# Patient Record
Sex: FEMALE | Race: WHITE | RURAL
[De-identification: names, ages, dates, MRNs, and addresses within clinical notes are randomized per-mention and may not be internally consistent; named-entity substitution may affect disease eponyms.]

---

## 2020-06-11 ENCOUNTER — OFFICE VISIT (OUTPATIENT)
Dept: FAMILY MEDICINE CLINIC | Age: 40
End: 2020-06-11

## 2020-06-11 ENCOUNTER — HOSPITAL ENCOUNTER (OUTPATIENT)
Dept: LAB | Age: 40
Discharge: HOME | End: 2020-06-11

## 2020-06-11 VITALS
DIASTOLIC BLOOD PRESSURE: 77 MMHG | SYSTOLIC BLOOD PRESSURE: 120 MMHG | RESPIRATION RATE: 20 BRPM | BODY MASS INDEX: 19.66 KG/M2 | WEIGHT: 118 LBS | HEART RATE: 80 BPM | HEIGHT: 65 IN | TEMPERATURE: 97.7 F | OXYGEN SATURATION: 100 %

## 2020-06-11 DIAGNOSIS — F90.2 ATTENTION DEFICIT HYPERACTIVITY DISORDER (ADHD), COMBINED TYPE: ICD-10-CM

## 2020-06-11 DIAGNOSIS — F90.2 ATTENTION DEFICIT HYPERACTIVITY DISORDER (ADHD), COMBINED TYPE: Primary | ICD-10-CM

## 2020-06-11 RX ORDER — MEDROXYPROGESTERONE ACETATE 150 MG/ML
150 INJECTION, SUSPENSION INTRAMUSCULAR ONCE
COMMUNITY
End: 2021-07-13

## 2020-06-11 RX ORDER — DEXTROAMPHETAMINE SACCHARATE, AMPHETAMINE ASPARTATE, DEXTROAMPHETAMINE SULFATE AND AMPHETAMINE SULFATE 5; 5; 5; 5 MG/1; MG/1; MG/1; MG/1
20 TABLET ORAL 2 TIMES DAILY
Qty: 60 TAB | Refills: 0 | Status: SHIPPED | OUTPATIENT
Start: 2020-06-11 | End: 2020-07-10 | Stop reason: SDUPTHER

## 2020-06-11 NOTE — LETTER
Name:Murali Lobo Payment KVV:5/08/4685 MR #:<[id number]> 2107 [facility] Page 1 of 5 CONTROLLED SUBSTANCE AGREEMENT I may be prescribed medications that are controlled substances as part  of my treatment plan for management of my medical condition(s). The goal of my treatment plan is to maintain and/or improve my health and wellbeing. Because controlled substances have an increased risk of abuse or harm, continual re-evaluation is needed determine if the goals of my treatment plan are being met for my safety and the safety of others. Espinoza Roque  am entering into this Controlled Substance Agreement with my provider, __________________________________ at [facility] . I understand that successful treatment requires mutual trust and honesty between me and my provider. I understand that there are state and federal laws and regulations which apply to the medications that my provider may prescribe that must be followed. I understand there are risks and benefits ts of taking the medicines that my provider may prescribe. I understand and agree that following this Agreement is necessary in continuing my provider-patient relationship and success of my treatment plan. As a part of my treatment plan, I agree to the following: COMMUNICATION: 
 
1. I will communicate fully with my provider about my medical condition(s), including the effect on my daily life and how well my medications are helping. I will tell my provider all of the medications that I take for any reason, including medications I receive from another health care provider, and will notify my provider about all issues, problems or concerns, including any side effects, which may be related to my medications. I understand that this information allows my provider to adjust my treatment plan to help manage my medical condition.  I understand that this information will become part of my permanent medical record. 2. I will notify my provider if I have a history of alcohol/drug misuse/addiction or if I have had treatment for alcohol/drug addiction in the past, or if I have a new problem with or concern about alcohol/drug use/addiction, because this increases the likelihood of high risk behaviors and may lead to serious medical conditions. 3. Females Only: I will notify my provider if I am or become pregnant, or if I intend to become pregnant, or if I intend to breastfeed. I understand that communication of these issues with my provider is important, due to possible effects my medication could have on an unborn fetus or breastfeeding child. Initials_____ Name:.Eli Subramanian AGT:1/59/5105 MR #:<[id number]> 2102 [facility] Page 2 of 5 MISUSE OF MEDICATIONS / DRUGS: 
 
1. I agree to take all controlled substances as prescribed, and will not misuse or abuse any controlled substances prescribed by my provider. For my safety, I will not increase the amount of medicine I take without first talking with and getting permission from my provider. 2. If I have a medical emergency, another health care provider may prescribe me medication. If I seek emergency treatment, I will notify my provider within seventy-two (72) hours. 3. I understand that my provider may discuss my use and/or possible misuse/abuse of controlled substances and alcohol, as appropriate, with any health care provider involved in my care, pharmacist or legal authority. ILLEGAL DRUGS: 
 
1. I will not use illegal drugs of any kind, including but not limited to marijuana, heroin, cocaine, or any prescription drug which is not prescribed to me. DRUG DIVERSION / PRESCRIPTION FRAUD: 
 
1. I will not share, sell, trade, give away, or otherwise misuse my prescriptions or medications. 2. I will not alter any prescriptions provided to me by my provider. SINGLE PROVIDER: 
 
1. I agree that all controlled substances that I take will be prescribed only by my provider (or his/her covering provider) under this Agreement. This agreement does not prevent me from seeking emergency medical treatment or receiving pain management related to a surgery. PROTECTING MEDICATIONS: 
 
1. I am responsible for keeping my prescriptions and medications in a safe and secure place including safeguarding them from loss or theft. I understand that lost, stolen or damaged/destroyed prescriptions or medications will not be replaced. Initials____ Name:Murali Fuentes File LXN:9/54/9799 MR #:<[id number]> 2102 [facility] Page 3 of 5 PRESCRIPTION RENEWALS/REFILLS: 
 
1. I will follow my controlled substance medication schedule as prescribed by my provider. 2. I understand and agree that I will make any requests for renewals or refills of my prescriptions only at the time of an office visit or during my providers regular office hours subject to the prescription refill requirements of the individual practice. 3. I understand that my provider may not call in prescriptions for controlled substances to my pharmacy. 4. I understand that my provider may adjust or discontinue these medications as deemed appropriate for my medical treatment plan. This Agreement does not guarantee the prescription of controlled medications. 5. I agree that if my medications are adjusted or discontinued, I will properly dispose of any remaining medications. I understand that I will be required to dispose of any remaining controlled medications prior to being provided with any prescriptions for other controlled medications.  
 
6. I understand that the renewal of my prescription depends on my medical condition, my consistent participation, and my adherence with my treatment plan and this Agreement. 7. I understand that if I do not keep an appointment with my provider, I may not receive a renewal or refill for my controlled substance medication. PRESCRIPTION MONITORING / DRUG TESTIN. I understand that my provider may require me to provide urine, saliva or blood for testing at any time. I understand that this testing will be used to monitor for safety and adherence with my treatment plan and this Agreement. 2. I understand that my provider may ask me to provide an observed urine specimen, which means that a nurse or other health care provider may watch me provide urine, and I agree to cooperate if I am asked to provide an observed specimen. 3. I understand that if I do not provide urine, saliva or blood samples within two (2) hours of my providers request, or other timeframe decided by my provider, my treatment plan could be changed, or my prescriptions and medications may be changed or ended. 4. I understand that urine, saliva and blood test results will be a part of my permanent medical record. Initials_____ Name:Murali Fuentes File ETQ:3/95/7863 MR #:<[id number]> 2102 [facility] Page 4 of 5 
 
 
1. I authorize my provider and my pharmacy to cooperate fully with any local, state, or federal law enforcement agency in the investigation of any possible misuse, sale, or other diversion of my controlled substance prescriptions or medications. RISKS: 
 
1. I understand that my level of consciousness may be affected from the use of controlled substances, and I understand that there are risks, benefits, effects and potential alternatives (including no treatment) to the medications that my provider has prescribed. 2. I understand that I may become drowsy, tired, dizzy, constipated, and sick to my stomach, or have changes in my mood or in my sleep while taking my medications. I have talked with my provider about these possible side effects, risks, benefits, and alternative treatments, and my provider has answered all of my questions. 3. I understand that I should not suddenly stop taking my medications without first speaking with my provider. I understand that if I suddenly stop taking my medications, I may experience nausea, vomiting, sweating,anxiety, sleeplessness, itching or other uncomfortable feelings. 4. I will not take my medications with alcohol of any kind, including beer, wine or liquor. I understand that drinking alcohol with my medications increases the chances of side effects, including breathing problems or even death. 5. I understand that if I have a history of alcoholism or other drug addiction I may be at increased risk of addiction to my medications. Signs of addiction might include craving, compulsive use, and continued use despite harm. Since addiction is a disease, I understand my provider may decide to change my medications and refer me to appropriate treatment services. I understand that this information would become part of my permanent medical record. Initials_____ Name:.Eli Jackson CVL:7/15/0858 MR #:<[id number]>  [facility] Page 5 of 5  
 
 
6. Females only: Children born to women who regularly take controlled substances are likely to have physical problems and suffer withdrawal symptoms at birth. If I am of child-bearing age, I understand that I should use safe and effective birth control while taking any controlled substances to avoid the impact of medications on an unborn fetus or  child. I agree to notify my provider immediately if I should become pregnant so that my treatment plan can be adjusted. 7. Males only: I understand that chronic use of controlled substances has been associated with low testosterone levels in males which may affect my mood, stamina, sexual desire, and general health. I understand that my provider may order the appropriate laboratory test to determine my testosterone level,and I agree to this testing. ADHERENCE: 
 
1. I understand that if I do not adhere to this Agreement in any way, my provider may change my prescriptions, stop prescribing controlled substances or end our provider-patient relationship. 2. If my provider decides to stop prescribing medication, or decides to end our provider-patient relationship,my provider may require that I taper my medications slowly. If necessary, my provider may also provide a prescription for other medications to treat my withdrawal symptoms. UNDERSTANDING THIS AGREEMENT: 
 
I understand that my provider may adjust or stop my prescriptions for controlled substances based on my medical condition and my treatment plan. I understand that this Agreement does not guarantee that I will be prescribed medications or controlled substances. I understand that controlled substances may be just one part 
of my treatment plan.  
 
My initial on each page and my signature below shows that I have read each page of this Agreement, I have had an opportunity to ask questions, and all of my questions have been answered to my satisfaction by my provider. By signing below, I agree to comply with this Agreement, and I understand that if I do not follow the Agreements listed above, my provider may stop 
 
_________________________________________  Date/Time 6/11/2020 10:45 AM   
             (Patient Signature) 
 
________________________________________    Date/Time 6/11/2020 10:45 AM 
 (Parent or Guardian Signature if <18 yrs) 
 
_________________________________________ Date/Time 6/11/2020 10:45 AM 
 (Provider Signature)

## 2020-06-11 NOTE — PROGRESS NOTES
1. Have you been to the ER, urgent care clinic since your last visit? Hospitalized since your last visit? No    2. Have you seen or consulted any other health care providers outside of the [address] [place] [address] since your last visit? Include any pap smears or colon screening. No    Reviewed record in preparation for visit and have necessary documentation  Goals that were addressed and/or need to be completed during or after this appointment include     Health Maintenance Due   Topic Date Due    DTaP/Tdap/Td series (1 - Tdap) 01/14/2002    PAP AKA CERVICAL CYTOLOGY  01/14/2002       Patient is accompanied by self I have received verbal consent from Tobin Mirza to discuss any/all medical information while they are present in the room.

## 2020-06-17 LAB
AMPHETAMINES UR QL SCN: NEGATIVE NG/ML
BARBITURATES UR QL SCN: NEGATIVE NG/ML
BENZODIAZ UR QL SCN: NEGATIVE NG/ML
BZE UR QL SCN: NEGATIVE NG/ML
CANNABINOID, 737736: POSITIVE
CANNABINOIDS UR QL SCN: NORMAL NG/ML
CARBOXY THC (GC/MS), 737737: 16 NG/ML
CREAT UR-MCNC: 22.8 MG/DL (ref 20–300)
METHADONE UR QL SCN: NEGATIVE NG/ML
OPIATES UR QL SCN: NEGATIVE NG/ML
OXYCODONE+OXYMORPHONE UR QL SCN: NEGATIVE NG/ML
PCP UR QL: NEGATIVE NG/ML
PH UR: 6.5 [PH] (ref 4.5–8.9)
PLEASE NOTE:, 733157: NORMAL
PROPOXYPH UR QL SCN: NEGATIVE NG/ML

## 2020-06-17 NOTE — PROGRESS NOTES
Progress Note    Patient: Yamil Mulligan MRN: [id number]  SSN: [id number]    YOB: 1980  Age: 45 y.o. Sex: female        Chief Complaint   Patient presents with   [address]     she is a 45y.o. year old female who presents to [facility] care. Patient seen at another practice. She says she was positive for cannaboids on UDS and was discharged. She says she had been using CBD oil. Denies use of marijuana or illicit drugs.  reviewed. Advised patient that if drug screen returns positive for [facility] she will need to choose between CBD or medication. Patient expressed understanding. [facility] signed. Addendum: UDS results from 2020. Component  Value  Flag  Ref Range  Units  Status    Cannabinoid  Positive  Abnormal    Cutoff=20     Final    Cannabinoid confirm  16   Cutoff=10  ng/mL  Final          Encounter Diagnoses   Name Primary?     Attention deficit hyperactivity disorder (ADHD), combined type Yes       Patient Active Problem List   Diagnosis Code    HX: contraception Z92.0    Cough R05    Pneumonia J18.9     Past Surgical History:   Procedure Laterality Date    DELIVERY       HX TONSILLECTOMY  2000    MD UNS ORAL SURG [address] BY REPORT  2009    wisdom teeth x4     Social History     Socioeconomic History    Marital status:      Spouse name: Not on file    Number of children: Not on file    Years of education: Not on file    Highest education level: Not on file   Occupational History    Not on file   Social Needs    Financial resource strain: Not on file    Food insecurity     Worry: Not on file     Inability: Not on file    Transportation needs     Medical: Not on file     Non-medical: Not on file   Tobacco Use    Smoking status: Never Smoker   Substance and Sexual Activity    Alcohol use: Yes     Comment: wine occasionally    Drug use: No    Sexual activity: Yes     Partners: Male     Birth control/protection: Pill   Lifestyle    Physical activity Days per week: Not on file     Minutes per session: Not on file    Stress: Not on file   Relationships    Social connections     Talks on phone: Not on file     Gets together: Not on file     Attends Zoroastrian service: Not on file     Active member of club or organization: Not on file     Attends meetings of clubs or organizations: Not on file     Relationship status: Not on file    Intimate partner violence     Fear of current or ex partner: Not on file     Emotionally abused: Not on file     Physically abused: Not on file     Forced sexual activity: Not on file   Other Topics Concern    Not on file   Social History Narrative    Not on file     Family History   Problem Relation Age of Onset    Hypertension Father     Diabetes Father     Asthma Brother     Cancer Paternal Grandfather     Alcohol abuse Neg Hx     Arthritis-rheumatoid Neg Hx     Bleeding Prob Neg Hx     Elevated Lipids Neg Hx     Headache Neg Hx     Heart Disease Neg Hx     Lung Disease Neg Hx     Migraines Neg Hx     Psychiatric Disorder Neg Hx     Stroke Neg Hx     Mental Retardation Neg Hx      Current Outpatient Medications   Medication Sig    multivitamin with iron (DAILY WILL WITH IRON PO) Take  by mouth.  medroxyPROGESTERone (Depo-Provera) 150 mg/mL syrg 150 mg by IntraMUSCular route once.  dextroamphetamine-amphetamine (ADDERALL) 20 mg tablet Take 1 Tab by mouth two (2) times a day. Max Daily Amount: 40 mg. No current facility-administered medications for this visit.       No Known Allergies    Review of Systems:  Constitutional: Negative for fatigue, malaise  Resp: Negative for cough, wheezing or SOB  CV: Negative for chest pain, dizziness or palpitations  GI: Negative for nausea or abdominal pain  MS: Negative for acute myalgias or arthralgias   Neuro: Negative for HA, weakness or paresthesia  Psych: see HPI    Vitals:    06/11/20 0957   BP: 120/77   Pulse: 80   Resp: 20   Temp: 97.7 °F (36.5 °C)   TempSrc: Oral SpO2: 100%   Weight: 118 lb (53.5 kg)   Height: 5' 5\" (1.651 m)       Physical Examination:  General: Well developed, well nourished, in no acute distress  Head: Normocephalic, atraumatic  Eyes: Sclera clear, EOMI  Neck: Normal range of motion  Respiratory: symmetrical, unlabored effort  Cardiovascular: Regular rate and rhythm  Extremities: Full range of motion, normal gait  Neurologic: No focal deficits  Psych: Active, alert and oriented. Affect appropriate       ICD-10-CM ICD-9-CM    1. Attention deficit hyperactivity disorder (ADHD), combined type F90.2 314.01 10-DRUG SCREEN W/CONF      dextroamphetamine-amphetamine (ADDERALL) 20 mg tablet     Plan of care:  Diagnoses were discussed in detail with patient. Medication risks/benefits/side effects discussed with patient. All of the patient's questions were addressed and answered to apparent satisfaction. The patient understands and agrees with our plan of care. The patient knows to call back if they have questions about the plan of care or if symptoms change. The patient received an After-Visit Summary which contains VS, diagnoses, orders, allergy and medication lists. No future appointments. Follow-up and Dispositions    · Return in about 3 months (around 9/11/2020).

## 2020-07-10 DIAGNOSIS — F90.2 ATTENTION DEFICIT HYPERACTIVITY DISORDER (ADHD), COMBINED TYPE: ICD-10-CM

## 2020-07-10 RX ORDER — DEXTROAMPHETAMINE SACCHARATE, AMPHETAMINE ASPARTATE, DEXTROAMPHETAMINE SULFATE AND AMPHETAMINE SULFATE 5; 5; 5; 5 MG/1; MG/1; MG/1; MG/1
20 TABLET ORAL 2 TIMES DAILY
Qty: 60 TAB | Refills: 0 | Status: SHIPPED | OUTPATIENT
Start: 2020-07-10 | End: 2020-08-24 | Stop reason: SDUPTHER

## 2020-07-10 RX ORDER — DEXTROAMPHETAMINE SACCHARATE, AMPHETAMINE ASPARTATE, DEXTROAMPHETAMINE SULFATE AND AMPHETAMINE SULFATE 5; 5; 5; 5 MG/1; MG/1; MG/1; MG/1
20 TABLET ORAL 2 TIMES DAILY
Qty: 60 TAB | Refills: 0 | OUTPATIENT
Start: 2020-07-10

## 2020-07-10 NOTE — TELEPHONE ENCOUNTER
Will refill. However, if she does not get the UDS Monday, I will terminate the controlled medication contract.

## 2020-07-10 NOTE — TELEPHONE ENCOUNTER
Patient came by office to complete lab work since her refill is due tomorrow, and she would like to have it before the weekend. Lab closed at 3:30 without any warning. Please advise.

## 2020-08-13 DIAGNOSIS — F90.2 ATTENTION DEFICIT HYPERACTIVITY DISORDER (ADHD), COMBINED TYPE: ICD-10-CM

## 2020-08-13 RX ORDER — DEXTROAMPHETAMINE SACCHARATE, AMPHETAMINE ASPARTATE, DEXTROAMPHETAMINE SULFATE AND AMPHETAMINE SULFATE 5; 5; 5; 5 MG/1; MG/1; MG/1; MG/1
20 TABLET ORAL 2 TIMES DAILY
Qty: 60 TAB | Refills: 0 | OUTPATIENT
Start: 2020-08-13

## 2020-08-14 ENCOUNTER — HOSPITAL ENCOUNTER (OUTPATIENT)
Dept: LAB | Age: 40
Discharge: HOME | End: 2020-08-14

## 2020-08-14 DIAGNOSIS — F90.2 ATTENTION DEFICIT HYPERACTIVITY DISORDER (ADHD), COMBINED TYPE: ICD-10-CM

## 2020-08-14 NOTE — TELEPHONE ENCOUNTER
Patient did not get drug screen ordered on 7/10/20. She was advised this was required for further refills of controlled medication. Request denied and will no longer prescribe controlled medication for this patient.

## 2020-08-14 NOTE — TELEPHONE ENCOUNTER
Pt states that she got off work early in July and came to do the UDS but the lab closed around 2:00 that day. She states that she can come do the UDS today. She is requesting a call back today.

## 2020-08-17 ENCOUNTER — TELEPHONE (OUTPATIENT)
Dept: FAMILY MEDICINE CLINIC | Age: 40
End: 2020-08-17

## 2020-08-17 NOTE — TELEPHONE ENCOUNTER
Called patient and advise her there was a problem with her urine which could not be tested for drug screen and she would need to come in to submit another urine sample. Advised her that the urine spilled and unable to test. Patient states she will go to the doctor somewhere and did not want to deal with our office.

## 2020-08-20 NOTE — TELEPHONE ENCOUNTER
Patient failed to return for ordered UDS. Patient has expressed that she wants to go elsewhere for her ADHD.

## 2020-08-21 ENCOUNTER — TELEPHONE (OUTPATIENT)
Dept: FAMILY MEDICINE CLINIC | Age: 40
End: 2020-08-21

## 2020-08-21 DIAGNOSIS — F90.2 ATTENTION DEFICIT HYPERACTIVITY DISORDER (ADHD), COMBINED TYPE: Primary | ICD-10-CM

## 2020-08-24 RX ORDER — DEXTROAMPHETAMINE SACCHARATE, AMPHETAMINE ASPARTATE, DEXTROAMPHETAMINE SULFATE AND AMPHETAMINE SULFATE 5; 5; 5; 5 MG/1; MG/1; MG/1; MG/1
20 TABLET ORAL 2 TIMES DAILY
Qty: 60 TAB | Refills: 0 | Status: SHIPPED | OUTPATIENT
Start: 2020-08-24 | End: 2020-09-21 | Stop reason: SDUPTHER

## 2020-08-24 NOTE — TELEPHONE ENCOUNTER
Pt states tat she came in for her drug test so she can get her medication refilled. Just wanting an update to what is going on. Please call Pt back.

## 2020-08-27 ENCOUNTER — VIRTUAL VISIT (OUTPATIENT)
Dept: FAMILY MEDICINE CLINIC | Age: 40
End: 2020-08-27
Payer: COMMERCIAL

## 2020-08-27 DIAGNOSIS — L25.5 DERMATITIS DUE TO PLANTS, INCLUDING POISON IVY, SUMAC, AND OAK: Primary | ICD-10-CM

## 2020-08-27 PROCEDURE — 96372 THER/PROPH/DIAG INJ SC/IM: CPT | Performed by: FAMILY MEDICINE

## 2020-08-27 PROCEDURE — 99213 OFFICE O/P EST LOW 20 MIN: CPT | Performed by: FAMILY MEDICINE

## 2020-08-27 RX ORDER — PREDNISONE 20 MG/1
20 TABLET ORAL 2 TIMES DAILY
Qty: 10 TAB | Refills: 0 | Status: SHIPPED | OUTPATIENT
Start: 2020-08-27 | End: 2020-09-01

## 2020-08-27 RX ORDER — TRIAMCINOLONE ACETONIDE 40 MG/ML
40 INJECTION, SUSPENSION INTRA-ARTICULAR; INTRAMUSCULAR ONCE
Qty: 1 ML | Refills: 0
Start: 2020-08-27 | End: 2020-08-27

## 2020-08-27 NOTE — PROGRESS NOTES
Sergei Baptiste is a 45 y.o. female evaluated via video on 20. Patient Identity confirmed by . Telephone encounter done in lieu of office visit due to extraordinary circumstances. A state of national and state emergency has been declared by the President and the [place] due to the [employer] pandemic. Pursuant to the emergency declaration under the [facility] [address] authority and the [employer] and [employer] Act, this Virtual  Visit was conducted, with patient's consent, to reduce the patient's risk of exposure to COVID-19 and provide continuity of care for an established patient. Herbie Lomeli LPN coordinated virtual visit    Consent:  Patient and/or health care decision maker is aware that he/she may receive a bill for this telephone encounter, depending on his insurance coverage, and has provided verbal consent to proceed: Yes    Physician Location: Office  Patient Location: Home    CC: poison ivy exposure  Information gathered from patient and/or health care decision maker. HPI: Sergei Baptiste is a 45 y.o. female who was evaluated by synchronous (real-time) audio technology from his/her home. Patient presents with complaint of pruritic rash on face and UEs for 2  day(s). There is a known exposure to poison ivy. Patient sans other complaints or concerns at this time. Current Outpatient Medications:     dextroamphetamine-amphetamine (ADDERALL) 20 mg tablet, Take 1 Tab by mouth two (2) times a day.  Max Daily Amount: 40 mg., Disp: 60 Tab, Rfl: 0    multivitamin with iron (DAILY WILL WITH IRON PO), Take  by mouth., Disp: , Rfl:     medroxyPROGESTERone (Depo-Provera) 150 mg/mL syrg, 150 mg by IntraMUSCular route once., Disp: , Rfl:      No Known Allergies     Patient Active Problem List    Diagnosis Date Noted    Pneumonia 2010    HX: contraception     Cough         Review of Systems:  Constitutional: Negative for fatigue, malaise  Derm: see HPI  Resp: Negative for cough, wheezing or SOB  CV: Negative for chest pain, dizziness or palpitations  GI: Negative for nausea or abdominal pain  MS: Negative for acute myalgias or arthralgias   Neuro: Negative for HA, weakness or paresthesia  Psych: Negative for depression or anxiety     Physical exam:  Skin: erythematous patches on face and hands     Assessment/Plan:  Details of this discussion including any medical advice provided:       ICD-10-CM ICD-9-CM    1. Dermatitis due to plants, including poison ivy, sumac, and oak  L25.5 692.6 TRIAMCINOLONE ACETONIDE INJ      triamcinolone acetonide (Kenalog) 40 mg/mL injection      predniSONE (DELTASONE) 20 mg tablet     Patient advised to come into office for steroid injection. Symptomatic therapy suggested: call prn if symptoms persist or worsen. Total Time: minutes: 5-10 minutes was spent on video discussing above problems and plan. Patient medical history, prior OV notes, vitals flow sheet, lab results, medications, and allergies were reviewed during this encounter. For phone encounters:  I affirm this is a patient initiated episode with an established Patient who has not had a related appointment within my department in the past 7 days or scheduled within the next 24 hours.     Note: not billable if this call serves to triage the patient into an appointment for the relevant concern    MD TAYLOR Ying [facility]  08/27/20

## 2020-08-27 NOTE — PROGRESS NOTES
1. Have you been to the ER, urgent care clinic since your last visit? Hospitalized since your last visit? No    2. Have you seen or consulted any other health care providers outside of the [address] [place] [address] since your last visit? Include any pap smears or colon screening.  No        Health Maintenance Due   Topic Date Due    DTaP/Tdap/Td series (1 - Tdap) 01/14/2002    PAP AKA CERVICAL CYTOLOGY  01/14/2002

## 2020-09-21 DIAGNOSIS — F90.2 ATTENTION DEFICIT HYPERACTIVITY DISORDER (ADHD), COMBINED TYPE: ICD-10-CM

## 2020-09-23 RX ORDER — DEXTROAMPHETAMINE SACCHARATE, AMPHETAMINE ASPARTATE, DEXTROAMPHETAMINE SULFATE AND AMPHETAMINE SULFATE 5; 5; 5; 5 MG/1; MG/1; MG/1; MG/1
20 TABLET ORAL 2 TIMES DAILY
Qty: 60 TAB | Refills: 0 | Status: SHIPPED | OUTPATIENT
Start: 2020-09-23 | End: 2020-10-26 | Stop reason: SDUPTHER

## 2020-10-26 ENCOUNTER — TRANSCRIBE ORDER (OUTPATIENT)
Dept: FAMILY MEDICINE CLINIC | Age: 40
End: 2020-10-26

## 2020-10-26 DIAGNOSIS — F90.2 ATTENTION DEFICIT HYPERACTIVITY DISORDER (ADHD), COMBINED TYPE: ICD-10-CM

## 2020-10-26 NOTE — TELEPHONE ENCOUNTER
----- Message from Courtney Templeton sent at 10/26/2020 10:15 AM EDT -----  Regarding: Jose Wesley MD  Medication Refill    Caller (if not patient):       Relationship of caller (if not patient):      Best contact number(s): [phone]      Name of medication and dosage if known:  dextroamphetamine-amphetamine (ADDERALL) 20 mg tablet      Is patient out of this medication (yes/no): Yes      Pharmacy name: [address], [place], [address]    Pharmacy listed in chart? (yes/no):  Pharmacy phone number:      Details to clarify the request: Pt advised would prefer the orange tablet to be filled       Courtney Templeton

## 2020-10-28 RX ORDER — DEXTROAMPHETAMINE SACCHARATE, AMPHETAMINE ASPARTATE, DEXTROAMPHETAMINE SULFATE AND AMPHETAMINE SULFATE 5; 5; 5; 5 MG/1; MG/1; MG/1; MG/1
20 TABLET ORAL 2 TIMES DAILY
Qty: 60 TAB | Refills: 0 | Status: SHIPPED | OUTPATIENT
Start: 2020-10-28 | End: 2020-12-18 | Stop reason: SDUPTHER

## 2020-12-16 DIAGNOSIS — F90.2 ATTENTION DEFICIT HYPERACTIVITY DISORDER (ADHD), COMBINED TYPE: ICD-10-CM

## 2020-12-16 RX ORDER — DEXTROAMPHETAMINE SACCHARATE, AMPHETAMINE ASPARTATE, DEXTROAMPHETAMINE SULFATE AND AMPHETAMINE SULFATE 5; 5; 5; 5 MG/1; MG/1; MG/1; MG/1
20 TABLET ORAL 2 TIMES DAILY
Qty: 60 TAB | Refills: 0 | Status: CANCELLED | OUTPATIENT
Start: 2020-12-16

## 2020-12-18 ENCOUNTER — VIRTUAL VISIT (OUTPATIENT)
Dept: FAMILY MEDICINE CLINIC | Age: 40
End: 2020-12-18
Payer: COMMERCIAL

## 2020-12-18 DIAGNOSIS — F90.2 ATTENTION DEFICIT HYPERACTIVITY DISORDER (ADHD), COMBINED TYPE: ICD-10-CM

## 2020-12-18 PROCEDURE — 99441 PR PHYS/QHP TELEPHONE EVALUATION 5-10 MIN: CPT | Performed by: FAMILY MEDICINE

## 2020-12-18 RX ORDER — DEXTROAMPHETAMINE SACCHARATE, AMPHETAMINE ASPARTATE, DEXTROAMPHETAMINE SULFATE AND AMPHETAMINE SULFATE 5; 5; 5; 5 MG/1; MG/1; MG/1; MG/1
20 TABLET ORAL 2 TIMES DAILY
Qty: 60 TAB | Refills: 0 | Status: SHIPPED | OUTPATIENT
Start: 2020-12-18 | End: 2021-01-18 | Stop reason: SDUPTHER

## 2020-12-18 NOTE — PROGRESS NOTES
Elida Cardenas is a 45 y.o. female evaluated via telephone on 20. Patient Identity confirmed by . Telephone encounter done in lieu of office visit due to extraordinary circumstances. A state of national and state emergency has been declared by the President and the [place] due to the [employer] pandemic. Pursuant to the emergency declaration under the [facility] [place] [id number] [facility] authority and the [employer] and [employer] Act, this Virtual  Visit was conducted, with patient's consent, to reduce the patient's risk of exposure to COVID-19 and provide continuity of care for an established patient. Raphael Gracia LPN coordinated virtual visit    Consent:  Patient and/or health care decision maker is aware that he/she may receive a bill for this telephone encounter, depending on his insurance coverage, and has provided verbal consent to proceed: Yes    Physician Location: Office  Patient Location: Home    CC: medication refill  Information gathered from patient and/or health care decision maker. HPI: Elida Cardenas is a 45 y.o. female who was evaluated by synchronous (real-time) audio technology from his/her home. Patient on medication for ADHD. She is scheduled for 3 month follow up appointment. Reviewed with patient that it is a [facility] policy that patient's on amphetamine medication have an encounter every 3 months for refills of medication. Patient expressed understanding. Patient feeling good sans other complaints or concerns at this time. Encounter Diagnoses   Name Primary?  Attention deficit hyperactivity disorder (ADHD), combined type          Current Outpatient Medications:     dextroamphetamine-amphetamine (ADDERALL) 20 mg tablet, Take 1 Tab by mouth two (2) times a day.  Max Daily Amount: 40 mg., Disp: 60 Tab, Rfl: 0    multivitamin with iron (DAILY WILL WITH IRON PO), Take  by mouth., Disp: , Rfl:    medroxyPROGESTERone (Depo-Provera) 150 mg/mL syrg, 150 mg by IntraMUSCular route once., Disp: , Rfl:      No Known Allergies     Patient Active Problem List    Diagnosis Date Noted    Pneumonia 05/11/2010    HX: contraception     Cough         Review of Systems:  Constitutional: Negative for fatigue, malaise  Psych: see HPI      Assessment/Plan:  Details of this discussion including any medical advice provided: Patient advised as a precaution to stay at home, practice regular hand washing with soap and warm water and to wear a mask and utilize social distancing when necessary to be out in public places. ICD-10-CM ICD-9-CM    1. Attention deficit hyperactivity disorder (ADHD), combined type  F90.2 314.01 dextroamphetamine-amphetamine (ADDERALL) 20 mg tablet       Symptomatic therapy suggested: call prn if symptoms persist or worsen. Total Time: minutes: 5-10 minutes was spent on phone discussing above problems and plan. Patient medical history, prior OV notes, vitals flow sheet, lab results, medications, and allergies were reviewed during this encounter. For phone encounters:  I affirm this is a patient initiated episode with an established Patient who has not had a related appointment within my department in the past 7 days or scheduled within the next 24 hours. Note: not billable if this call serves to triage the patient into an appointment for the relevant concern    Follow-up and Dispositions    · Return in about 3 months (around 3/18/2021).          MD TAYLOR Rayo & SG HARGROVE [facility]  12/18/20

## 2020-12-18 NOTE — LETTER
12/21/2020 9:30 AM 
 
Ms. Serafin Savage [address] Your doctor is interested in not only helping you feel better when you are sick, but also in keeping you from getting sick in the first place. In the spirit of maintaining your good health, we look forward to seeing you. APPOINTMENT REMINDER March 19, 2021 for Virtual Visit with Dr. [facility] Thank you, TAYLOR RAO & SG HARGROVE [facility] [place]

## 2020-12-18 NOTE — PROGRESS NOTES
1. Have you been to the ER, urgent care clinic, or been hospitalized since your last visit? No     2. Have you seen or consulted any other health care providers outside of the [address] [place] [address] since your last visit?   No     Reviewed record in preparation for visit and have necessary documentation  Goals that were addressed and/or need to be completed during or after this appointment include   Health Maintenance Due   Topic Date Due    Pneumococcal 0-64 years (1 of 1 - PPSV23) 01/14/1987    DTaP/Tdap/Td series (1 - Tdap) 01/14/2002    PAP AKA CERVICAL CYTOLOGY  01/14/2002    Flu Vaccine (1) 09/01/2020

## 2021-01-18 DIAGNOSIS — F90.2 ATTENTION DEFICIT HYPERACTIVITY DISORDER (ADHD), COMBINED TYPE: ICD-10-CM

## 2021-01-19 RX ORDER — DEXTROAMPHETAMINE SACCHARATE, AMPHETAMINE ASPARTATE, DEXTROAMPHETAMINE SULFATE AND AMPHETAMINE SULFATE 5; 5; 5; 5 MG/1; MG/1; MG/1; MG/1
20 TABLET ORAL 2 TIMES DAILY
Qty: 60 TAB | Refills: 0 | Status: SHIPPED | OUTPATIENT
Start: 2021-01-19 | End: 2021-02-17 | Stop reason: SDUPTHER

## 2021-01-20 ENCOUNTER — TELEPHONE (OUTPATIENT)
Dept: FAMILY MEDICINE CLINIC | Age: 41
End: 2021-01-20

## 2021-01-20 DIAGNOSIS — F90.2 ATTENTION DEFICIT HYPERACTIVITY DISORDER (ADHD), COMBINED TYPE: ICD-10-CM

## 2021-01-20 RX ORDER — DEXTROAMPHETAMINE SACCHARATE, AMPHETAMINE ASPARTATE, DEXTROAMPHETAMINE SULFATE AND AMPHETAMINE SULFATE 5; 5; 5; 5 MG/1; MG/1; MG/1; MG/1
20 TABLET ORAL 2 TIMES DAILY
Qty: 60 TAB | Refills: 0 | Status: CANCELLED | OUTPATIENT
Start: 2021-01-20

## 2021-01-20 NOTE — TELEPHONE ENCOUNTER
Dr. Hazel Anthony told Pt he would call in her ADDERALL. She has not gotten it yet. Please call her to .

## 2021-02-17 DIAGNOSIS — F90.2 ATTENTION DEFICIT HYPERACTIVITY DISORDER (ADHD), COMBINED TYPE: ICD-10-CM

## 2021-02-17 RX ORDER — DEXTROAMPHETAMINE SACCHARATE, AMPHETAMINE ASPARTATE, DEXTROAMPHETAMINE SULFATE AND AMPHETAMINE SULFATE 5; 5; 5; 5 MG/1; MG/1; MG/1; MG/1
20 TABLET ORAL 2 TIMES DAILY
Qty: 60 TAB | Refills: 0 | Status: SHIPPED | OUTPATIENT
Start: 2021-02-17 | End: 2021-03-09 | Stop reason: SDUPTHER

## 2021-02-17 NOTE — TELEPHONE ENCOUNTER
----- Message from Lorenzo Barth sent at 2/17/2021  8:04 AM EST -----  Regarding: Dr. Antonio Shah (if not patient):pt      Relationship of caller (if not patient):pt      Best contact number(s): [phone]      Name of medication and dosage if known:  Adderall      Is patient out of this medication (yes/no):no      Pharmacy name: [address] [place] [address] listed in chart? (yes):yes  [facility]      Details to clarify the request:n/a      Lornezo Barth

## 2021-03-09 DIAGNOSIS — F90.2 ATTENTION DEFICIT HYPERACTIVITY DISORDER (ADHD), COMBINED TYPE: ICD-10-CM

## 2021-03-12 RX ORDER — DEXTROAMPHETAMINE SACCHARATE, AMPHETAMINE ASPARTATE, DEXTROAMPHETAMINE SULFATE AND AMPHETAMINE SULFATE 5; 5; 5; 5 MG/1; MG/1; MG/1; MG/1
20 TABLET ORAL 2 TIMES DAILY
Qty: 60 TAB | Refills: 0 | Status: SHIPPED | OUTPATIENT
Start: 2021-03-12 | End: 2021-04-13 | Stop reason: SDUPTHER

## 2021-03-16 DIAGNOSIS — F90.2 ATTENTION DEFICIT HYPERACTIVITY DISORDER (ADHD), COMBINED TYPE: ICD-10-CM

## 2021-03-16 RX ORDER — DEXTROAMPHETAMINE SACCHARATE, AMPHETAMINE ASPARTATE, DEXTROAMPHETAMINE SULFATE AND AMPHETAMINE SULFATE 5; 5; 5; 5 MG/1; MG/1; MG/1; MG/1
20 TABLET ORAL 2 TIMES DAILY
Qty: 60 TAB | Refills: 0 | OUTPATIENT
Start: 2021-03-16

## 2021-03-19 ENCOUNTER — VIRTUAL VISIT (OUTPATIENT)
Dept: FAMILY MEDICINE CLINIC | Age: 41
End: 2021-03-19
Payer: COMMERCIAL

## 2021-03-19 DIAGNOSIS — F90.2 ATTENTION DEFICIT HYPERACTIVITY DISORDER (ADHD), COMBINED TYPE: Primary | ICD-10-CM

## 2021-03-19 PROCEDURE — 99442 PR PHYS/QHP TELEPHONE EVALUATION 11-20 MIN: CPT | Performed by: FAMILY MEDICINE

## 2021-03-19 NOTE — PROGRESS NOTES
1. Have you been to the ER, urgent care clinic since your last visit? Hospitalized since your last visit? No    2. Have you seen or consulted any other health care providers outside of the [address] [place] [address] since your last visit? Include any pap smears or colon screening.  No        Health Maintenance Due   Topic Date Due    Hepatitis C Screening  Never done    Pneumococcal 0-64 years (1 of 1 - PPSV23) Never done    DTaP/Tdap/Td series (1 - Tdap) Never done    PAP AKA CERVICAL CYTOLOGY  Never done    Flu Vaccine (1) Never done    Lipid Screen  Never done

## 2021-03-21 NOTE — PROGRESS NOTES
Jennell Phalen is a 37 y.o. female evaluated via telephone on 21. Patient Identity confirmed by . Telephone encounter done in lieu of office visit due to extraordinary circumstances. A state of national and state emergency has been declared by the President and the [place] due to the [employer] pandemic. Pursuant to the emergency declaration under the [facility] [place] [address] [facility] authority and the [employer] and [employer] Act, this Virtual  Visit was conducted, with patient's consent, to reduce the patient's risk of exposure to COVID-19 and provide continuity of care for an established patient. Tylor Hackett LPN coordinated virtual visit    Consent:  Patient and/or health care decision maker is aware that he/she may receive a bill for this telephone encounter, depending on his insurance coverage, and has provided verbal consent to proceed: Yes    Physician Location: Office  Patient Location: Home    CC: medication refill  Information gathered from patient and/or health care decision maker. HPI: Jennell Phalen is a 37 y.o. female who was evaluated by synchronous (real-time) audio technology from his/her home. Patient on medication for ADHD. She is scheduled for 3 month follow up appointment. Patient reports that she has been followed bu hematology for anemia. Patient has not disclosed this previously. No notes in EHR from hematology. Will request last 3 encounter notes. Patient expressed understanding. Patient feeling good sans other complaints or concerns at this time. Encounter Diagnoses   Name Primary?  Attention deficit hyperactivity disorder (ADHD), combined type Yes         Current Outpatient Medications:     dextroamphetamine-amphetamine (ADDERALL) 20 mg tablet, Take 1 Tab by mouth two (2) times a day.  Max Daily Amount: 40 mg., Disp: 60 Tab, Rfl: 0    multivitamin with iron (DAILY WILL WITH IRON PO), Take  by mouth., Disp: , Rfl:     medroxyPROGESTERone (Depo-Provera) 150 mg/mL syrg, 150 mg by IntraMUSCular route once., Disp: , Rfl:      No Known Allergies     Patient Active Problem List    Diagnosis Date Noted    Pneumonia 05/11/2010    HX: contraception     Cough         Review of Systems:  Constitutional: Negative for fatigue, malaise  Psych: see HPI      Assessment/Plan:  Details of this discussion including any medical advice provided: Patient advised as a precaution to stay at home, practice regular hand washing with soap and warm water and to wear a mask and utilize social distancing when necessary to be out in public places. ICD-10-CM ICD-9-CM    1. Attention deficit hyperactivity disorder (ADHD), combined type  F90.2 314.01        Symptomatic therapy suggested: call prn if symptoms persist or worsen. Total Time: minutes: 11-20 minutes was spent on phone discussing above problems and plan. Patient medical history, prior OV notes, vitals flow sheet, lab results, medications, and allergies were reviewed during this encounter. For phone encounters:  I affirm this is a patient initiated episode with an established Patient who has not had a related appointment within my department in the past 7 days or scheduled within the next 24 hours. Note: not billable if this call serves to triage the patient into an appointment for the relevant concern    Follow-up and Dispositions    · Return in about 3 months (around 6/19/2021).          MD TAYLOR [place] & SG [facility]GR[facility]  03/21/21

## 2021-04-13 DIAGNOSIS — F90.2 ATTENTION DEFICIT HYPERACTIVITY DISORDER (ADHD), COMBINED TYPE: ICD-10-CM

## 2021-04-13 NOTE — TELEPHONE ENCOUNTER
----- Message from Shana Grajeda sent at 4/13/2021  8:58 AM EDT -----  Regarding: Dr Micky Mosher (if not patient): pt      Relationship of caller (if not patient):      Best contact number(s):[phone]       Name of medication and dosage if known: \"adderall unknown dosage\"      Is patient out of this medication (yes/no): no      Pharmacy name: [facility]    Pharmacy listed in chart? (yes/no): yes  Pharmacy phone [place]      Details to clarify the request: wants to make sure it's the correct one.        Shana Grajeda

## 2021-04-15 RX ORDER — DEXTROAMPHETAMINE SACCHARATE, AMPHETAMINE ASPARTATE, DEXTROAMPHETAMINE SULFATE AND AMPHETAMINE SULFATE 5; 5; 5; 5 MG/1; MG/1; MG/1; MG/1
20 TABLET ORAL 2 TIMES DAILY
Qty: 60 TAB | Refills: 0 | Status: SHIPPED | OUTPATIENT
Start: 2021-04-15 | End: 2021-05-10 | Stop reason: SDUPTHER

## 2021-05-10 DIAGNOSIS — F90.2 ATTENTION DEFICIT HYPERACTIVITY DISORDER (ADHD), COMBINED TYPE: ICD-10-CM

## 2021-05-11 RX ORDER — DEXTROAMPHETAMINE SACCHARATE, AMPHETAMINE ASPARTATE, DEXTROAMPHETAMINE SULFATE AND AMPHETAMINE SULFATE 5; 5; 5; 5 MG/1; MG/1; MG/1; MG/1
20 TABLET ORAL 2 TIMES DAILY
Qty: 60 TAB | Refills: 0 | Status: SHIPPED | OUTPATIENT
Start: 2021-05-11 | End: 2021-06-10 | Stop reason: SDUPTHER

## 2021-05-13 NOTE — TELEPHONE ENCOUNTER
Called patient. She was advised Rx sent to [facility] 5-11-21. Verbalized understanding.

## 2021-05-13 NOTE — TELEPHONE ENCOUNTER
----- Message from Moon Chang sent at 5/13/2021 11:06 AM EDT -----  Regarding: Dr. Ajay Wood Message/Vendor Calls    Caller's first and last name: N/A      Reason for call: Prescription status. Callback required yes/no and why: yes, when Rx called in. Best contact number(s): [phone]      Details to clarify the request: Checking status of prescription for \"Adderall\" to be called in to [facility]. Patient stated she sent refill request over a week ago.       Moon Chang

## 2021-06-08 DIAGNOSIS — F90.2 ATTENTION DEFICIT HYPERACTIVITY DISORDER (ADHD), COMBINED TYPE: ICD-10-CM

## 2021-06-08 RX ORDER — DEXTROAMPHETAMINE SACCHARATE, AMPHETAMINE ASPARTATE, DEXTROAMPHETAMINE SULFATE AND AMPHETAMINE SULFATE 5; 5; 5; 5 MG/1; MG/1; MG/1; MG/1
20 TABLET ORAL 2 TIMES DAILY
Qty: 60 TABLET | Refills: 0 | OUTPATIENT
Start: 2021-06-08

## 2021-06-08 NOTE — TELEPHONE ENCOUNTER
Attempted to call. No answer. Message left. OV and lab needs to be scheduled with Dr Maurice Hathaway.

## 2021-06-09 ENCOUNTER — TELEPHONE (OUTPATIENT)
Dept: FAMILY MEDICINE CLINIC | Age: 41
End: 2021-06-09

## 2021-06-09 NOTE — TELEPHONE ENCOUNTER
[facility] policy for refill of controlled medications such as Adderall requires appointment every 3 months. This was discussed when [facility] was signed and is documented again during Marika Roque 1237 on 12/8/21. There are no exceptions.

## 2021-06-09 NOTE — TELEPHONE ENCOUNTER
----- Message from [facility] sent at 6/9/2021 12:26 PM EDT -----  Regarding: Dr. Kolby Garcia  Patient return call    Caller's first and last name and relationship (if not the patient): n/a      Best contact number(s): [phone]      Whose call is being returned: Carlotta Copping      Details to clarify the request: [address]

## 2021-06-09 NOTE — TELEPHONE ENCOUNTER
Called patient to schedule appointment for renewal of Adderall. She stated she could not come in for an Office Visit this month because she works a full time job and needs more notice.

## 2021-06-09 NOTE — TELEPHONE ENCOUNTER
Attempted to call. No answer. Message left. Advise patient per Dr Wang:\"[facility] policy for refill of controlled medications such as Adderall requires appointment every 3 months. This was discussed when [facility] was signed and is documented again during Marika Roque 1237 on 12/8/21. There are no exceptions. \"

## 2021-06-09 NOTE — TELEPHONE ENCOUNTER
Pt states she had a VV in March for her medication. Did not understand how come she needs another. Please call.  Thanks

## 2021-06-10 ENCOUNTER — VIRTUAL VISIT (OUTPATIENT)
Dept: FAMILY MEDICINE CLINIC | Age: 41
End: 2021-06-10
Payer: COMMERCIAL

## 2021-06-10 DIAGNOSIS — F90.2 ATTENTION DEFICIT HYPERACTIVITY DISORDER (ADHD), COMBINED TYPE: ICD-10-CM

## 2021-06-10 PROCEDURE — 99213 OFFICE O/P EST LOW 20 MIN: CPT | Performed by: FAMILY MEDICINE

## 2021-06-10 RX ORDER — DEXTROAMPHETAMINE SACCHARATE, AMPHETAMINE ASPARTATE, DEXTROAMPHETAMINE SULFATE AND AMPHETAMINE SULFATE 5; 5; 5; 5 MG/1; MG/1; MG/1; MG/1
20 TABLET ORAL 2 TIMES DAILY
Qty: 60 TABLET | Refills: 0 | Status: SHIPPED | OUTPATIENT
Start: 2021-06-10 | End: 2021-07-13 | Stop reason: SDUPTHER

## 2021-06-10 NOTE — PROGRESS NOTES
Cristin Todd is a 37 y.o. female who was seen by synchronous (real-time) audio-video technology. Consent:  Patient and/or their healthcare decision maker is aware that this patient-initiated Telehealth encounter is a billable service, with coverage as determined by their insurance carrier. They are aware that they may receive a bill and have provided verbal consent to proceed: Yes    I was in the office while conducting this encounter. Platform: Doxy. me    HPI: Pt is a 37 y.o. female who presents for f/u ADHD. She reports that she is doing well on the Adderrall and has been taking it for many years. She is focusing well and denies difficulty sleeping or decreased appetite. Past Medical History:   Diagnosis Date    Cough     HX: contraception        Family History   Problem Relation Age of Onset    Hypertension Father     Diabetes Father     Asthma Brother     Cancer Paternal Grandfather     Alcohol abuse Neg Hx     Arthritis-rheumatoid Neg Hx     Bleeding Prob Neg Hx     Elevated Lipids Neg Hx     Headache Neg Hx     Heart Disease Neg Hx     Lung Disease Neg Hx     Migraines Neg Hx     Psychiatric Disorder Neg Hx     Stroke Neg Hx     Mental Retardation Neg Hx        Social History     Tobacco Use    Smoking status: Never Smoker    Smokeless tobacco: Never Used   Substance Use Topics    Alcohol use: Yes     Comment: wine occasionally    Drug use: No       ROS:  Per HPI    PE:  There were no vitals taken for this visit. Gen: Pt in NAD  Head: Normocephalic, atraumatic  Eyes: Sclera anicteric, EOM grossly intact  Throat: MMM  Neck: Supple  Resp: Speaking easily in full sentences without respiratory distress  Neuro: Alert, oriented, appropriate      A/P:   Encounter Diagnoses     ICD-10-CM ICD-9-CM   1. Attention deficit hyperactivity disorder (ADHD), combined type  F90.2 314.01     1. Attention deficit hyperactivity disorder (ADHD), combined type: Stable.  Last UDS in 8/2020 and appropriate. Discussed case with pt's PCP and he would like to see her in person prior to the next refill. Advised pt of this and she will plan to make an appt with him before this 30 day supply runs out. Discussed office policies for controlled substances and she is in agreement.  reviewed and appropriate. - Refill Adderrall       RTC before 4 weeks for in person visit with PCP for f/u ADHD    Discussed diagnoses in detail with patient. Medication risks/benefits/side effects discussed with patient. All of the patient's questions were addressed. The patient understands and agrees with our plan of care. The patient knows to call back if they are unsure of or forget any changes we discussed today or if the symptoms change. Holland Rocha is a 37 y.o. female being evaluated by a video visit encounter for concerns as above. A caregiver was present when appropriate. Due to this being a TeleHealth encounter ([facility]), evaluation of the following organ systems was limited: Vitals/Constitutional/EENT/Resp/CV/GI//MS/Neuro/Skin/Heme-Lymph-Imm. Pursuant to the emergency declaration under the [facility] waiver authority and the [employer] and [employer] Act, this Virtual  Visit was conducted, with patient's (and/or legal guardian's) consent, to reduce the patient's risk of exposure to COVID-19 and provide necessary medical care. Services were provided through a video synchronous discussion virtually to substitute for in-person clinic visit. Patient and provider were located in their home and in the office, respectively. Current Outpatient Medications on File Prior to Visit   Medication Sig Dispense Refill    dextroamphetamine-amphetamine (ADDERALL) 20 mg tablet Take 1 Tab by mouth two (2) times a day.  Max Daily Amount: 40 mg. 60 Tab 0    multivitamin with iron (DAILY WILL WITH IRON PO) Take  by mouth.  medroxyPROGESTERone (Depo-Provera) 150 mg/mL syrg 150 mg by IntraMUSCular route once. No current facility-administered medications on file prior to visit.

## 2021-07-13 ENCOUNTER — OFFICE VISIT (OUTPATIENT)
Dept: FAMILY MEDICINE CLINIC | Age: 41
End: 2021-07-13
Payer: COMMERCIAL

## 2021-07-13 VITALS
SYSTOLIC BLOOD PRESSURE: 121 MMHG | HEART RATE: 62 BPM | TEMPERATURE: 98.1 F | OXYGEN SATURATION: 100 % | RESPIRATION RATE: 18 BRPM | BODY MASS INDEX: 20.66 KG/M2 | HEIGHT: 65 IN | DIASTOLIC BLOOD PRESSURE: 73 MMHG | WEIGHT: 124 LBS

## 2021-07-13 DIAGNOSIS — F90.2 ATTENTION DEFICIT HYPERACTIVITY DISORDER (ADHD), COMBINED TYPE: ICD-10-CM

## 2021-07-13 PROCEDURE — 99213 OFFICE O/P EST LOW 20 MIN: CPT | Performed by: FAMILY MEDICINE

## 2021-07-13 RX ORDER — DEXTROAMPHETAMINE SACCHARATE, AMPHETAMINE ASPARTATE, DEXTROAMPHETAMINE SULFATE AND AMPHETAMINE SULFATE 5; 5; 5; 5 MG/1; MG/1; MG/1; MG/1
20 TABLET ORAL 2 TIMES DAILY
Qty: 60 TABLET | Refills: 0 | Status: SHIPPED | OUTPATIENT
Start: 2021-07-13 | End: 2021-08-04 | Stop reason: SDUPTHER

## 2021-07-13 RX ORDER — LORATADINE 10 MG/1
10 TABLET ORAL DAILY
COMMUNITY

## 2021-07-13 RX ORDER — FEXOFENADINE HYDROCHLORIDE AND PSEUDOEPHEDRINE HYDROCHLORIDE 180; 240 MG/1; MG/1
1 TABLET, FILM COATED, EXTENDED RELEASE ORAL DAILY
COMMUNITY

## 2021-07-13 NOTE — PROGRESS NOTES
1. Have you been to the ER, urgent care clinic since your last visit? Hospitalized since your last visit? No    2. Have you seen or consulted any other health care providers outside of the [address] [place] [address] since your last visit? Include any pap smears or colon screening. No    Reviewed record in preparation for visit and have necessary documentation  Goals that were addressed and/or need to be completed during or after this appointment include     Health Maintenance Due   Topic Date Due    Hepatitis C Screening  Never done    Pneumococcal 0-64 years (1 of 2 - PPSV23) Never done    COVID-19 Vaccine (1) Never done    DTaP/Tdap/Td series (1 - Tdap) Never done    PAP AKA CERVICAL CYTOLOGY  Never done    Lipid Screen  Never done       Patient is accompanied by self I have received verbal consent from Ej Benitez to discuss any/all medical information while they are present in the room.

## 2021-07-19 NOTE — PROGRESS NOTES
Progress Note    Patient: Familia Rashid MRN: [id number]  SSN: [id number]    YOB: 1980  Age: 37 y.o. Sex: female        Chief Complaint   Patient presents with    Follow Up Chronic Condition    Medication Refill     she is a 37y.o. year old female who presents for follow up of ADHD. Says current dose effective. Encounter Diagnoses   Name Primary?  Attention deficit hyperactivity disorder (ADHD), combined type      Wt Readings from Last 3 Encounters:   21 124 lb (56.2 kg)   20 118 lb (53.5 kg)   10/08/14 116 lb 9.6 oz (52.9 kg)     Body mass index is 20.63 kg/m². Patient Active Problem List   Diagnosis Code    HX: contraception Z92.0    Cough R05    Pneumonia J18.9     Past Surgical History:   Procedure Laterality Date    DELIVERY       HX TONSILLECTOMY  2000    OK UNS ORAL SURG [address] BY REPORT  2009    wisdom teeth x4     Social History     Socioeconomic History    Marital status:      Spouse name: Not on file    Number of children: Not on file    Years of education: Not on file    Highest education level: Not on file   Occupational History    Not on file   Tobacco Use    Smoking status: Never Smoker    Smokeless tobacco: Never Used   Substance and Sexual Activity    Alcohol use: Yes     Comment: wine occasionally    Drug use: No    Sexual activity: Yes     Partners: Male     Birth control/protection: Pill   Other Topics Concern    Not on file   Social History Narrative    Not on file     Social Determinants of Health     Financial Resource Strain:     Difficulty of Paying Living Expenses:    Food Insecurity:     Worried About Running Out of Food in the Last Year:     Ran Out of Food in the Last Year:    Transportation Needs:     Lack of Transportation (Medical):      Lack of Transportation (Non-Medical):    Physical Activity:     Days of Exercise per Week:     Minutes of Exercise per Session:    Stress:     Feeling of Stress : Social Connections:     Frequency of Communication with Friends and Family:     Frequency of Social Gatherings with Friends and Family:     Attends [employer] Services:     Active Member of Clubs or Organizations:     Attends Club or Organization Meetings:     Marital Status:    Intimate Partner Violence:     Fear of Current or Ex-Partner:     Emotionally Abused:     Physically Abused:     Sexually Abused:      Family History   Problem Relation Age of Onset    Hypertension Father     Diabetes Father     Asthma Brother     Cancer Paternal Grandfather     Alcohol abuse Neg Hx     Arthritis-rheumatoid Neg Hx     Bleeding Prob Neg Hx     Elevated Lipids Neg Hx     Headache Neg Hx     Heart Disease Neg Hx     Lung Disease Neg Hx     Migraines Neg Hx     Psychiatric Disorder Neg Hx     Stroke Neg Hx     Mental Retardation Neg Hx      Current Outpatient Medications   Medication Sig    fexofenadine-pseudoephedrine (Allegra-D 24 Hour) 180-240 mg per tablet Take 1 Tablet by mouth daily.  loratadine (Claritin) 10 mg tablet Take 10 mg by mouth daily.  dextroamphetamine-amphetamine (ADDERALL) 20 mg tablet Take 1 Tablet by mouth two (2) times a day. Max Daily Amount: 40 mg.    multivitamin with iron (DAILY WILL WITH IRON PO) Take  by mouth. No current facility-administered medications for this visit.      No Known Allergies    Review of Systems:  Constitutional: Negative for fatigue, malaise  Resp: Negative for cough, wheezing or SOB  CV: Negative for chest pain, dizziness or palpitations  GI: Negative for nausea or abdominal pain  MS: Negative for acute myalgias or arthralgias   Neuro: Negative for HA, weakness or paresthesia  Psych: see HPI    Vitals:    07/13/21 1357   BP: 121/73   Pulse: 62   Resp: 18   Temp: 98.1 °F (36.7 °C)   TempSrc: Oral   SpO2: 100%   Weight: 124 lb (56.2 kg)   Height: 5' 5\" (1.651 m)       Physical Examination:  General: Well developed, well nourished, in no acute distress  Head: Normocephalic, atraumatic  Eyes: Sclera clear, EOMI  Neck: Normal range of motion  Respiratory: symmetrical, unlabored effort  Cardiovascular: Regular rate and rhythm  Extremities: Full range of motion, normal gait  Neurologic: No focal deficits  Psych: Active, alert and oriented. Affect appropriate       ICD-10-CM ICD-9-CM    1. Attention deficit hyperactivity disorder (ADHD), combined type  F90.2 314.01 dextroamphetamine-amphetamine (ADDERALL) 20 mg tablet     Plan of care:  Diagnoses were discussed in detail with patient. Medication risks/benefits/side effects discussed with patient. All of the patient's questions were addressed and answered to apparent satisfaction. The patient understands and agrees with our plan of care. The patient knows to call back if they have questions about the plan of care or if symptoms change. The patient received an After-Visit Summary which contains VS, diagnoses, orders, allergy and medication lists. Future Appointments   Date Time Provider Mary Valle   10/13/2021  1:00 PM Ana Aguilar MD [facility]         Follow-up and Dispositions    · Return in about 3 months (around 10/13/2021), or if symptoms worsen or fail to improve.

## 2021-08-04 DIAGNOSIS — F90.2 ATTENTION DEFICIT HYPERACTIVITY DISORDER (ADHD), COMBINED TYPE: ICD-10-CM

## 2021-08-06 RX ORDER — DEXTROAMPHETAMINE SACCHARATE, AMPHETAMINE ASPARTATE, DEXTROAMPHETAMINE SULFATE AND AMPHETAMINE SULFATE 5; 5; 5; 5 MG/1; MG/1; MG/1; MG/1
20 TABLET ORAL 2 TIMES DAILY
Qty: 60 TABLET | Refills: 0 | Status: SHIPPED | OUTPATIENT
Start: 2021-08-09 | End: 2021-09-07 | Stop reason: SDUPTHER

## 2021-09-07 DIAGNOSIS — F90.2 ATTENTION DEFICIT HYPERACTIVITY DISORDER (ADHD), COMBINED TYPE: ICD-10-CM

## 2021-09-07 NOTE — TELEPHONE ENCOUNTER
----- Message from Caden Land 3449 sent at 9/7/2021 11:21 AM EDT -----  Regarding: dr sanchez/ refill  Medication Refill    Caller (if not patient): pt      Relationship of caller (if not patient):      Best contact number(s): [phone]      Name of medication and dosage if known: dextroamphetamine-amphetamine (ADDERALL) 20 mg tablet       Is patient out of this medication (yes/no): almost      Pharmacy name: [place] listed in chart? (yes/no): yes  [facility]armacy [place] [facility]      Details to clarify the request:      Caden Land [id number]

## 2021-09-09 RX ORDER — DEXTROAMPHETAMINE SACCHARATE, AMPHETAMINE ASPARTATE, DEXTROAMPHETAMINE SULFATE AND AMPHETAMINE SULFATE 5; 5; 5; 5 MG/1; MG/1; MG/1; MG/1
20 TABLET ORAL 2 TIMES DAILY
Qty: 60 TABLET | Refills: 0 | Status: SHIPPED | OUTPATIENT
Start: 2021-09-09 | End: 2021-10-07 | Stop reason: SDUPTHER

## 2021-10-07 DIAGNOSIS — F90.2 ATTENTION DEFICIT HYPERACTIVITY DISORDER (ADHD), COMBINED TYPE: ICD-10-CM

## 2021-10-08 RX ORDER — DEXTROAMPHETAMINE SACCHARATE, AMPHETAMINE ASPARTATE, DEXTROAMPHETAMINE SULFATE AND AMPHETAMINE SULFATE 5; 5; 5; 5 MG/1; MG/1; MG/1; MG/1
20 TABLET ORAL 2 TIMES DAILY
Qty: 60 TABLET | Refills: 0 | Status: SHIPPED | OUTPATIENT
Start: 2021-10-08 | End: 2021-11-07

## 2021-10-13 ENCOUNTER — TELEPHONE (OUTPATIENT)
Dept: FAMILY MEDICINE CLINIC | Age: 41
End: 2021-10-13

## 2021-10-13 NOTE — TELEPHONE ENCOUNTER
Please call patient. She was a no show today for her 3  month follow up which is required as part of her A[facility] 16. She needs to schedule an appointment ASAP or I will no longer be able to prescribe her Adderall.

## 2021-10-13 NOTE — TELEPHONE ENCOUNTER
Called patient. She was advised she is required to have a 3 month f/u as part of her [facility]. She stated that she didn't realize she had an appt today. Appt scheduled.

## 2023-05-24 RX ORDER — FEXOFENADINE HCL AND PSEUDOEPHEDRINE HCI 180; 240 MG/1; MG/1
1 TABLET, EXTENDED RELEASE ORAL DAILY
COMMUNITY

## 2023-05-24 RX ORDER — LORATADINE 10 MG/1
10 TABLET ORAL DAILY
COMMUNITY

## 2024-04-12 LAB — HCG SERPL QL: POSITIVE

## 2024-04-20 LAB — HCG SERPL QL: POSITIVE

## 2024-04-22 LAB — HCG SERPL-ACNC: 13 MIU/ML (ref 0–6)
